# Patient Record
Sex: MALE | Race: OTHER | HISPANIC OR LATINO
[De-identification: names, ages, dates, MRNs, and addresses within clinical notes are randomized per-mention and may not be internally consistent; named-entity substitution may affect disease eponyms.]

---

## 2018-02-07 PROBLEM — Z00.00 ENCOUNTER FOR PREVENTIVE HEALTH EXAMINATION: Status: ACTIVE | Noted: 2018-02-07

## 2018-03-09 ENCOUNTER — APPOINTMENT (OUTPATIENT)
Dept: PULMONOLOGY | Facility: CLINIC | Age: 28
End: 2018-03-09
Payer: COMMERCIAL

## 2018-03-09 VITALS
BODY MASS INDEX: 23.03 KG/M2 | DIASTOLIC BLOOD PRESSURE: 60 MMHG | SYSTOLIC BLOOD PRESSURE: 120 MMHG | OXYGEN SATURATION: 97 % | HEIGHT: 72 IN | HEART RATE: 77 BPM | WEIGHT: 170 LBS

## 2018-03-09 DIAGNOSIS — Z82.0 FAMILY HISTORY OF EPILEPSY AND OTHER DISEASES OF THE NERVOUS SYSTEM: ICD-10-CM

## 2018-03-09 DIAGNOSIS — Z78.9 OTHER SPECIFIED HEALTH STATUS: ICD-10-CM

## 2018-03-09 DIAGNOSIS — Z87.09 PERSONAL HISTORY OF OTHER DISEASES OF THE RESPIRATORY SYSTEM: ICD-10-CM

## 2018-03-09 DIAGNOSIS — Z82.61 FAMILY HISTORY OF ARTHRITIS: ICD-10-CM

## 2018-03-09 PROCEDURE — 94010 BREATHING CAPACITY TEST: CPT

## 2018-03-09 PROCEDURE — 99204 OFFICE O/P NEW MOD 45 MIN: CPT | Mod: 25

## 2018-03-09 PROCEDURE — 71046 X-RAY EXAM CHEST 2 VIEWS: CPT

## 2018-03-12 ENCOUNTER — INPATIENT (INPATIENT)
Facility: HOSPITAL | Age: 28
LOS: 7 days | Discharge: HOME | End: 2018-03-20
Attending: PSYCHIATRY & NEUROLOGY | Admitting: PSYCHIATRY & NEUROLOGY
Payer: COMMERCIAL

## 2018-03-12 VITALS
RESPIRATION RATE: 16 BRPM | HEART RATE: 74 BPM | SYSTOLIC BLOOD PRESSURE: 118 MMHG | TEMPERATURE: 99 F | OXYGEN SATURATION: 100 % | DIASTOLIC BLOOD PRESSURE: 78 MMHG

## 2018-03-12 LAB
ALBUMIN SERPL ELPH-MCNC: 5 G/DL — SIGNIFICANT CHANGE UP (ref 3.3–5)
ALP SERPL-CCNC: 62 U/L — SIGNIFICANT CHANGE UP (ref 40–120)
ALT FLD-CCNC: 16 U/L — SIGNIFICANT CHANGE UP (ref 4–41)
AMPHET UR-MCNC: NEGATIVE — SIGNIFICANT CHANGE UP
ANISOCYTOSIS BLD QL: SLIGHT — SIGNIFICANT CHANGE UP
APAP SERPL-MCNC: < 15 UG/ML — LOW (ref 15–25)
APPEARANCE UR: CLEAR — SIGNIFICANT CHANGE UP
AST SERPL-CCNC: 19 U/L — SIGNIFICANT CHANGE UP (ref 4–40)
BARBITURATES MEASUREMENT: NEGATIVE — SIGNIFICANT CHANGE UP
BARBITURATES UR SCN-MCNC: NEGATIVE — SIGNIFICANT CHANGE UP
BASOPHILS NFR SPEC: 0.9 % — SIGNIFICANT CHANGE UP (ref 0–2)
BENZODIAZ SERPL-MCNC: NEGATIVE — SIGNIFICANT CHANGE UP
BENZODIAZ UR-MCNC: NEGATIVE — SIGNIFICANT CHANGE UP
BILIRUB SERPL-MCNC: 0.5 MG/DL — SIGNIFICANT CHANGE UP (ref 0.2–1.2)
BILIRUB UR-MCNC: NEGATIVE — SIGNIFICANT CHANGE UP
BLOOD UR QL VISUAL: NEGATIVE — SIGNIFICANT CHANGE UP
BUN SERPL-MCNC: 9 MG/DL — SIGNIFICANT CHANGE UP (ref 7–23)
CALCIUM SERPL-MCNC: 9.5 MG/DL — SIGNIFICANT CHANGE UP (ref 8.4–10.5)
CANNABINOIDS UR-MCNC: NEGATIVE — SIGNIFICANT CHANGE UP
CHLORIDE SERPL-SCNC: 100 MMOL/L — SIGNIFICANT CHANGE UP (ref 98–107)
CO2 SERPL-SCNC: 32 MMOL/L — HIGH (ref 22–31)
COCAINE METAB.OTHER UR-MCNC: NEGATIVE — SIGNIFICANT CHANGE UP
COLOR SPEC: SIGNIFICANT CHANGE UP
CREAT SERPL-MCNC: 1.06 MG/DL — SIGNIFICANT CHANGE UP (ref 0.5–1.3)
EOSINOPHIL NFR FLD: 0.9 % — SIGNIFICANT CHANGE UP (ref 0–6)
ETHANOL BLD-MCNC: < 10 MG/DL — SIGNIFICANT CHANGE UP
GIANT PLATELETS BLD QL SMEAR: PRESENT — SIGNIFICANT CHANGE UP
GLUCOSE SERPL-MCNC: 89 MG/DL — SIGNIFICANT CHANGE UP (ref 70–99)
GLUCOSE UR-MCNC: NEGATIVE — SIGNIFICANT CHANGE UP
HCT VFR BLD CALC: 51.9 % — HIGH (ref 39–50)
HGB BLD-MCNC: 17.8 G/DL — HIGH (ref 13–17)
KETONES UR-MCNC: NEGATIVE — SIGNIFICANT CHANGE UP
LEUKOCYTE ESTERASE UR-ACNC: NEGATIVE — SIGNIFICANT CHANGE UP
LYMPHOCYTES NFR SPEC AUTO: 43.5 % — SIGNIFICANT CHANGE UP (ref 13–44)
MCHC RBC-ENTMCNC: 30.8 PG — SIGNIFICANT CHANGE UP (ref 27–34)
MCHC RBC-ENTMCNC: 34.3 % — SIGNIFICANT CHANGE UP (ref 32–36)
MCV RBC AUTO: 89.9 FL — SIGNIFICANT CHANGE UP (ref 80–100)
METHADONE UR-MCNC: NEGATIVE — SIGNIFICANT CHANGE UP
MONOCYTES NFR BLD: 5.2 % — SIGNIFICANT CHANGE UP (ref 2–9)
MUCOUS THREADS # UR AUTO: SIGNIFICANT CHANGE UP
NEUTROPHIL AB SER-ACNC: 44.3 % — SIGNIFICANT CHANGE UP (ref 43–77)
NITRITE UR-MCNC: NEGATIVE — SIGNIFICANT CHANGE UP
OPIATES UR-MCNC: NEGATIVE — SIGNIFICANT CHANGE UP
OXYCODONE UR-MCNC: NEGATIVE — SIGNIFICANT CHANGE UP
PCP UR-MCNC: NEGATIVE — SIGNIFICANT CHANGE UP
PH UR: 6.5 — SIGNIFICANT CHANGE UP (ref 4.6–8)
PLATELET # BLD AUTO: 114 K/UL — LOW (ref 150–400)
PLATELET COUNT - ESTIMATE: SIGNIFICANT CHANGE UP
PMV BLD: 13.6 FL — HIGH (ref 7–13)
POLYCHROMASIA BLD QL SMEAR: SLIGHT — SIGNIFICANT CHANGE UP
POTASSIUM SERPL-MCNC: 4.3 MMOL/L — SIGNIFICANT CHANGE UP (ref 3.5–5.3)
POTASSIUM SERPL-SCNC: 4.3 MMOL/L — SIGNIFICANT CHANGE UP (ref 3.5–5.3)
PROT SERPL-MCNC: 7.7 G/DL — SIGNIFICANT CHANGE UP (ref 6–8.3)
PROT UR-MCNC: NEGATIVE MG/DL — SIGNIFICANT CHANGE UP
RBC # BLD: 5.77 M/UL — SIGNIFICANT CHANGE UP (ref 4.2–5.8)
RBC # FLD: 12.3 % — SIGNIFICANT CHANGE UP (ref 10.3–14.5)
RBC CASTS # UR COMP ASSIST: SIGNIFICANT CHANGE UP (ref 0–?)
SALICYLATES SERPL-MCNC: < 5 MG/DL — LOW (ref 15–30)
SODIUM SERPL-SCNC: 141 MMOL/L — SIGNIFICANT CHANGE UP (ref 135–145)
SP GR SPEC: 1.01 — SIGNIFICANT CHANGE UP (ref 1–1.04)
SQUAMOUS # UR AUTO: SIGNIFICANT CHANGE UP
TSH SERPL-MCNC: 1.29 UIU/ML — SIGNIFICANT CHANGE UP (ref 0.27–4.2)
UROBILINOGEN FLD QL: NORMAL MG/DL — SIGNIFICANT CHANGE UP
VARIANT LYMPHS # BLD: 5.2 % — SIGNIFICANT CHANGE UP
WBC # BLD: 5.13 K/UL — SIGNIFICANT CHANGE UP (ref 3.8–10.5)
WBC # FLD AUTO: 5.13 K/UL — SIGNIFICANT CHANGE UP (ref 3.8–10.5)
WBC UR QL: SIGNIFICANT CHANGE UP (ref 0–?)

## 2018-03-12 PROCEDURE — 99285 EMERGENCY DEPT VISIT HI MDM: CPT

## 2018-03-12 NOTE — ED BEHAVIORAL HEALTH ASSESSMENT NOTE - SUBSTANCE ISSUES AND PLAN (INCLUDE STANDING AND PRN MEDICATION)
patient does not report any recent consistent alcohol use (last drink Saturday, previously not in weeks), no hx of DTs, no indication for ciwa

## 2018-03-12 NOTE — ED BEHAVIORAL HEALTH ASSESSMENT NOTE - SUMMARY
28 y/o single male, domiciled alone in an apartment in [place],  studying data analytics, history of depressive symptoms x 7 years, history of therapy, no prior psychiatric hospitalizations, describes 2 suicide attempts in past, no violence hx, no legal problems, history of binge drinking ETOH, BIB EMS from [facility] after patient told the providers there that he was having suicidal ideation with plan to jump from his 10th story apartment window.  In ED, patient is depressed, reporting suicidal thoughts with plan to jump from 10th story apartment building. He states he is feeling hopeless, overwhelmed & is seeking help. He would benefit greatly from an inpatient psychiatric hospitalization for safety and stabilization.

## 2018-03-12 NOTE — ED ADULT NURSE REASSESSMENT NOTE - ANCILLARY STATUS
----- Message from Angela Wood sent at 9/6/2017  1:03 PM CDT -----  Contact: Fabian Velasco (Spouse)  _ x 1st Request  _  2nd Request  _  3rd Request        Who: Fabian Velasco (Spouse)    Why: Patient would like a referral to see a doctor for his shoulder. Please call    What Number to Call Back: [phone]    When to Expect a call back: (With in 24 hours)                     ekg done/lab results pending

## 2018-03-12 NOTE — ED PROVIDER NOTE - MEDICAL DECISION MAKING DETAILS
This is a 27 year old male PMHX GEMA from [facility] for psych eval r/t suicidal ideations.  Patient reports increased depression and increased anxiety has given him thoughts to jump out of his doom window. Reports he went to the counseling center for help and they called EMS Medical evaluation performed. There is no clinical evidence of intoxication or any acute medical problem requiring immediate intervention. Final disposition will be determined by psychiatrist.

## 2018-03-12 NOTE — ED BEHAVIORAL HEALTH ASSESSMENT NOTE - HPI (INCLUDE ILLNESS QUALITY, SEVERITY, DURATION, TIMING, CONTEXT, MODIFYING FACTORS, ASSOCIATED SIGNS AND SYMPTOMS)
26 y/o single male, domiciled alone in an apartment in [place],  studying data analytics, no dependents, history of depressive symptoms x 7 years, history of therapy, no hx of medication trials, no prior psychiatric hospitalizations, describes 2 suicide attempts in past, no violence hx, no legal problems, history of binge drinking ETOH, no hx of Dts/complicated withdrawals, no drug use, PMH of scoliosis, BIB EMS from [facility] after patient told the providers there that he was having suicidal ideation with plan to jump from his 10th story apartment window.    Patient reports that he has a 7 year history of depressive symptoms, which primarily consist of sad mood, low self-worth, anhedonia, preoccupation with death & significant memory/concentration issues. He states that the symptoms began in high school after he sustained a collapsed lung and was away from school for 1 month. He states since that time, he has noticed that his symptoms have become more intense and suicide has become a more serious consideration for him. He states that in December he bought a bottle of wine & consumed the bottle in 2 hours with a disregard for his health & safety. He states that he considers this a passive suicide attempt. Patient reports that recently, he has been looking at the window in his apartment and thinking about jumping. He states that he was seriously considering this over the weekend but reached out to friends for help. He states that today, the suicidal impulses began so strong that he went to the counseling center for help. He states he is currently very depressed, anxious & overwhelmed.   He reports having periods of time where his mood is elevated, he has more energy & has a decreased need for sleep, but denies any hx of dangerous/risk taking behaviors during these periods. He reports that he often feels a compulsion to engage in constant activity, which is a consistent trait rather than episodic. He states that he is obsessive about various things at different times; states currently he is focused on grades (the suicide attempt in December was in response to an A-) but in the past he has had other objects of focus such as working out excessively or participating in sports "from morning until night".     Patient does not want parents involved in his care as he feels they will not be supportive if he chooses to take medication.  Writer called Dr. Hernández but she did not return voicemail left. Secure email sent.

## 2018-03-12 NOTE — ED ADULT NURSE NOTE - CHIEF COMPLAINT QUOTE
pt brought in by ems for increased stress for the past few weeks. pt from [facility] and reached out to behavioral health services for SI today with plan to jump out of 10th story window. pt states he feels better after talking to psychiatrist today, denies SI at this time. denies drug/alcohol use. denies HI. denies pmh

## 2018-03-12 NOTE — ED PROVIDER NOTE - OBJECTIVE STATEMENT
This is a 27 year old male PMhx non GEMA from [facility]  no past atempt no hospittal no meds no theapis + SI thoughts This is a 27 year old male PMHX BIBIA from [facility] for psych eval r/t suicidal ideations.  Patient reports increased depression and increased anxiety has given him thoughts to jump out of his doom window. Reports he went to the counseling center for help and they called EMS. Denies pasty SI attempts Denies any self harm behaviors. Denies past psych admissions Denies medication  Denies HI Denies AH/VH Denies ETOH/Illicit drugs

## 2018-03-12 NOTE — ED BEHAVIORAL HEALTH ASSESSMENT NOTE - DETAILS
reports hx of holding a knife with plan to stab self but being interrupted by mother years ago; in dec 2017 drank bottle of wine in 2 hours and identifies this as a suicide attempt birthmark on face handoff to Dr. Obregon Dr. Hernández did not respond

## 2018-03-12 NOTE — ED BEHAVIORAL HEALTH ASSESSMENT NOTE - DESCRIPTION
anxious cooperative  ICU Vital Signs Last 24 Hrs  T(C): 37 (12 Mar 2018 21:01), Max: 37 (12 Mar 2018 21:01)  T(F): 98.6 (12 Mar 2018 21:01), Max: 98.6 (12 Mar 2018 21:01)  HR: 74 (12 Mar 2018 21:01) (74 - 74)  BP: 118/78 (12 Mar 2018 21:01) (118/78 - 118/78)  BP(mean): --  ABP: --  ABP(mean): --  RR: 16 (12 Mar 2018 21:01) (16 - 16)  SpO2: 100% (12 Mar 2018 21:01) (100% - 100%) scoliosis see hpi

## 2018-03-12 NOTE — ED BEHAVIORAL HEALTH ASSESSMENT NOTE - SUICIDE RISK FACTORS
Anhedonia/Hopelessness/Mood episode/Highly impulsive behavior/Substance abuse/dependence/Agitation/severe anxiety/Access to means (pills, firearms, etc.)

## 2018-03-12 NOTE — ED ADULT NURSE REASSESSMENT NOTE - GENERAL PATIENT STATE
reports ongoing suicidal thoughts to jump off of a roof, no intent to harm self at this time/comfortable appearance

## 2018-03-12 NOTE — ED BEHAVIORAL HEALTH ASSESSMENT NOTE - RISK ASSESSMENT
Risk factors include history of suicide attempts, substance abuse, living alone, no dependents, impulsivity, impaired insight & judgement, current suicidal ideation with plan and intent. This patient is at high risk for harm and requires inpatient level of care for safety and stabilization.

## 2018-03-13 DIAGNOSIS — F33.2 MAJOR DEPRESSIVE DISORDER, RECURRENT SEVERE WITHOUT PSYCHOTIC FEATURES: ICD-10-CM

## 2018-03-13 DIAGNOSIS — R69 ILLNESS, UNSPECIFIED: ICD-10-CM

## 2018-03-13 DIAGNOSIS — F33.9 MAJOR DEPRESSIVE DISORDER, RECURRENT, UNSPECIFIED: ICD-10-CM

## 2018-03-13 PROCEDURE — 99223 1ST HOSP IP/OBS HIGH 75: CPT | Mod: 25

## 2018-03-13 PROCEDURE — 90853 GROUP PSYCHOTHERAPY: CPT

## 2018-03-13 RX ORDER — IBUPROFEN 200 MG
600 TABLET ORAL EVERY 6 HOURS
Qty: 0 | Refills: 0 | Status: DISCONTINUED | OUTPATIENT
Start: 2018-03-13 | End: 2018-03-20

## 2018-03-13 RX ORDER — ACETAMINOPHEN 500 MG
650 TABLET ORAL EVERY 6 HOURS
Qty: 0 | Refills: 0 | Status: DISCONTINUED | OUTPATIENT
Start: 2018-03-13 | End: 2018-03-20

## 2018-03-13 RX ORDER — TRAZODONE HCL 50 MG
50 TABLET ORAL AT BEDTIME
Qty: 0 | Refills: 0 | Status: DISCONTINUED | OUTPATIENT
Start: 2018-03-13 | End: 2018-03-20

## 2018-03-13 RX ORDER — HYDROXYZINE HCL 10 MG
50 TABLET ORAL
Qty: 0 | Refills: 0 | Status: DISCONTINUED | OUTPATIENT
Start: 2018-03-14 | End: 2018-03-14

## 2018-03-13 RX ORDER — HYDROXYZINE HCL 10 MG
50 TABLET ORAL ONCE
Qty: 0 | Refills: 0 | Status: COMPLETED | OUTPATIENT
Start: 2018-03-13 | End: 2018-03-13

## 2018-03-13 RX ADMIN — Medication 50 MILLIGRAM(S): at 16:56

## 2018-03-13 NOTE — ED ADULT NURSE REASSESSMENT NOTE - NS ED NURSE REASSESS COMMENT FT1
Medically cleared by Erin SMITH for [facility] admission to [facility]. Pt remains awake, a&ox3, calm. Denies intent to self harm while in hospital, but has depressed affect. Report given to Genaro GODWIN. Pt ordered to transfer with security by Lulú SMITH.

## 2018-03-14 PROCEDURE — 99232 SBSQ HOSP IP/OBS MODERATE 35: CPT

## 2018-03-14 RX ORDER — CLONAZEPAM 1 MG
0.5 TABLET ORAL
Qty: 0 | Refills: 0 | Status: DISCONTINUED | OUTPATIENT
Start: 2018-03-14 | End: 2018-03-15

## 2018-03-14 RX ADMIN — Medication 50 MILLIGRAM(S): at 08:43

## 2018-03-14 RX ADMIN — Medication 0.5 MILLIGRAM(S): at 17:58

## 2018-03-15 PROCEDURE — 90853 GROUP PSYCHOTHERAPY: CPT

## 2018-03-15 RX ORDER — CLONAZEPAM 1 MG
0.5 TABLET ORAL
Qty: 0 | Refills: 0 | Status: DISCONTINUED | OUTPATIENT
Start: 2018-03-15 | End: 2018-03-20

## 2018-03-15 RX ORDER — FLUOXETINE HCL 10 MG
10 CAPSULE ORAL DAILY
Qty: 0 | Refills: 0 | Status: DISCONTINUED | OUTPATIENT
Start: 2018-03-16 | End: 2018-03-16

## 2018-03-15 RX ORDER — FLUOXETINE HCL 10 MG
10 CAPSULE ORAL ONCE
Qty: 0 | Refills: 0 | Status: COMPLETED | OUTPATIENT
Start: 2018-03-15 | End: 2018-03-15

## 2018-03-15 RX ADMIN — Medication 10 MILLIGRAM(S): at 18:19

## 2018-03-15 RX ADMIN — Medication 0.5 MILLIGRAM(S): at 21:10

## 2018-03-15 RX ADMIN — Medication 0.5 MILLIGRAM(S): at 09:21

## 2018-03-16 PROCEDURE — 99232 SBSQ HOSP IP/OBS MODERATE 35: CPT

## 2018-03-16 RX ORDER — FLUOXETINE HCL 10 MG
20 CAPSULE ORAL DAILY
Qty: 0 | Refills: 0 | Status: DISCONTINUED | OUTPATIENT
Start: 2018-03-16 | End: 2018-03-19

## 2018-03-16 RX ADMIN — Medication 0.5 MILLIGRAM(S): at 21:07

## 2018-03-16 RX ADMIN — Medication 0.5 MILLIGRAM(S): at 08:43

## 2018-03-16 RX ADMIN — Medication 10 MILLIGRAM(S): at 08:43

## 2018-03-17 PROCEDURE — 99232 SBSQ HOSP IP/OBS MODERATE 35: CPT

## 2018-03-17 RX ADMIN — Medication 0.5 MILLIGRAM(S): at 21:11

## 2018-03-17 RX ADMIN — Medication 20 MILLIGRAM(S): at 08:57

## 2018-03-17 RX ADMIN — Medication 0.5 MILLIGRAM(S): at 08:57

## 2018-03-18 RX ADMIN — Medication 20 MILLIGRAM(S): at 08:54

## 2018-03-18 RX ADMIN — Medication 50 MILLIGRAM(S): at 22:45

## 2018-03-18 RX ADMIN — Medication 0.5 MILLIGRAM(S): at 21:14

## 2018-03-18 RX ADMIN — Medication 0.5 MILLIGRAM(S): at 08:54

## 2018-03-19 PROCEDURE — 99232 SBSQ HOSP IP/OBS MODERATE 35: CPT

## 2018-03-19 RX ORDER — FLUOXETINE HCL 10 MG
30 CAPSULE ORAL DAILY
Qty: 0 | Refills: 0 | Status: DISCONTINUED | OUTPATIENT
Start: 2018-03-19 | End: 2018-03-20

## 2018-03-19 RX ORDER — FLUOXETINE HCL 10 MG
1 CAPSULE ORAL
Qty: 30 | Refills: 0 | OUTPATIENT
Start: 2018-03-19 | End: 2018-04-17

## 2018-03-19 RX ORDER — CLONAZEPAM 1 MG
1 TABLET ORAL
Qty: 60 | Refills: 0 | OUTPATIENT
Start: 2018-03-19 | End: 2018-04-17

## 2018-03-19 RX ORDER — FLUOXETINE HCL 10 MG
3 CAPSULE ORAL
Qty: 90 | Refills: 0 | OUTPATIENT
Start: 2018-03-19 | End: 2018-04-17

## 2018-03-19 RX ADMIN — Medication 0.5 MILLIGRAM(S): at 22:16

## 2018-03-19 RX ADMIN — Medication 50 MILLIGRAM(S): at 23:30

## 2018-03-19 RX ADMIN — Medication 0.5 MILLIGRAM(S): at 08:53

## 2018-03-19 RX ADMIN — Medication 20 MILLIGRAM(S): at 08:53

## 2018-03-20 VITALS — HEART RATE: 82 BPM | SYSTOLIC BLOOD PRESSURE: 125 MMHG | DIASTOLIC BLOOD PRESSURE: 77 MMHG | TEMPERATURE: 98 F

## 2018-03-20 PROCEDURE — 90853 GROUP PSYCHOTHERAPY: CPT

## 2018-03-20 PROCEDURE — 90832 PSYTX W PT 30 MINUTES: CPT | Mod: 59

## 2018-03-20 PROCEDURE — 99238 HOSP IP/OBS DSCHRG MGMT 30/<: CPT | Mod: 25

## 2018-03-20 RX ADMIN — Medication 0.5 MILLIGRAM(S): at 08:24

## 2018-03-20 RX ADMIN — Medication 30 MILLIGRAM(S): at 08:24

## 2018-03-26 ENCOUNTER — OUTPATIENT (OUTPATIENT)
Dept: OUTPATIENT SERVICES | Facility: HOSPITAL | Age: 28
LOS: 1 days | Discharge: HOME | End: 2018-03-26
Payer: COMMERCIAL

## 2018-03-27 DIAGNOSIS — F33.2 MAJOR DEPRESSIVE DISORDER, RECURRENT SEVERE WITHOUT PSYCHOTIC FEATURES: ICD-10-CM

## 2018-03-27 LAB
A1AT SERPL-MCNC: 116 MG/DL
ERYTHROCYTE [SEDIMENTATION RATE] IN BLOOD BY WESTERGREN METHOD: 2 MM/HR

## 2018-03-29 LAB
A1AT PHENOTYP SERPL-IMP: NORMAL BANDS
A1AT SERPL-MCNC: 108 MG/DL

## 2018-04-04 LAB — SERPINA1 GENE MUT TESTED BLD/T: NORMAL

## 2018-04-13 ENCOUNTER — APPOINTMENT (OUTPATIENT)
Dept: PULMONOLOGY | Facility: CLINIC | Age: 28
End: 2018-04-13
Payer: COMMERCIAL

## 2018-04-13 VITALS
SYSTOLIC BLOOD PRESSURE: 110 MMHG | DIASTOLIC BLOOD PRESSURE: 70 MMHG | HEIGHT: 72 IN | HEART RATE: 85 BPM | BODY MASS INDEX: 23.03 KG/M2 | RESPIRATION RATE: 15 BRPM | OXYGEN SATURATION: 95 % | WEIGHT: 170 LBS

## 2018-04-13 DIAGNOSIS — R09.1 PLEURISY: ICD-10-CM

## 2018-04-13 DIAGNOSIS — R07.9 CHEST PAIN, UNSPECIFIED: ICD-10-CM

## 2018-04-13 DIAGNOSIS — R93.8 ABNORMAL FINDINGS ON DIAGNOSTIC IMAGING OF OTHER SPECIFIED BODY STRUCTURES: ICD-10-CM

## 2018-04-13 DIAGNOSIS — Z87.09 PERSONAL HISTORY OF OTHER DISEASES OF THE RESPIRATORY SYSTEM: ICD-10-CM

## 2018-04-13 DIAGNOSIS — R06.02 SHORTNESS OF BREATH: ICD-10-CM

## 2018-04-13 DIAGNOSIS — R06.83 SNORING: ICD-10-CM

## 2018-04-13 DIAGNOSIS — R91.1 SOLITARY PULMONARY NODULE: ICD-10-CM

## 2018-04-13 PROCEDURE — 94729 DIFFUSING CAPACITY: CPT

## 2018-04-13 PROCEDURE — 94010 BREATHING CAPACITY TEST: CPT

## 2018-04-13 PROCEDURE — 99214 OFFICE O/P EST MOD 30 MIN: CPT | Mod: 25

## 2018-04-13 PROCEDURE — 94727 GAS DIL/WSHOT DETER LNG VOL: CPT

## 2018-10-15 ENCOUNTER — APPOINTMENT (OUTPATIENT)
Dept: PULMONOLOGY | Facility: CLINIC | Age: 28
End: 2018-10-15

## 2018-12-03 ENCOUNTER — APPOINTMENT (OUTPATIENT)
Dept: INTERNAL MEDICINE | Facility: CLINIC | Age: 28
End: 2018-12-03

## 2019-07-26 LAB
ALBUMIN SERPL ELPH-MCNC: 4.7 G/DL — SIGNIFICANT CHANGE UP (ref 3.3–5)
ALP SERPL-CCNC: 55 U/L — SIGNIFICANT CHANGE UP (ref 40–120)
ALT FLD-CCNC: 12 U/L — SIGNIFICANT CHANGE UP (ref 4–41)
ANION GAP SERPL CALC-SCNC: 10 MMO/L — SIGNIFICANT CHANGE UP (ref 7–14)
APPEARANCE UR: CLEAR — SIGNIFICANT CHANGE UP
AST SERPL-CCNC: 17 U/L — SIGNIFICANT CHANGE UP (ref 4–40)
BILIRUB SERPL-MCNC: 0.4 MG/DL — SIGNIFICANT CHANGE UP (ref 0.2–1.2)
BILIRUB UR-MCNC: NEGATIVE — SIGNIFICANT CHANGE UP
BLOOD UR QL VISUAL: NEGATIVE — SIGNIFICANT CHANGE UP
BUN SERPL-MCNC: 14 MG/DL — SIGNIFICANT CHANGE UP (ref 7–23)
CALCIUM SERPL-MCNC: 9.9 MG/DL — SIGNIFICANT CHANGE UP (ref 8.4–10.5)
CHLORIDE SERPL-SCNC: 104 MMOL/L — SIGNIFICANT CHANGE UP (ref 98–107)
CHLORIDE UR-SCNC: 167 MMOL/L — SIGNIFICANT CHANGE UP
CO2 SERPL-SCNC: 28 MMOL/L — SIGNIFICANT CHANGE UP (ref 22–31)
COLOR SPEC: YELLOW — SIGNIFICANT CHANGE UP
CREAT SERPL-MCNC: 1.01 MG/DL — SIGNIFICANT CHANGE UP (ref 0.5–1.3)
GLUCOSE SERPL-MCNC: 80 MG/DL — SIGNIFICANT CHANGE UP (ref 70–99)
GLUCOSE UR-MCNC: NEGATIVE — SIGNIFICANT CHANGE UP
KETONES UR-MCNC: NEGATIVE — SIGNIFICANT CHANGE UP
LEUKOCYTE ESTERASE UR-ACNC: NEGATIVE — SIGNIFICANT CHANGE UP
NITRITE UR-MCNC: NEGATIVE — SIGNIFICANT CHANGE UP
PH UR: 7 — SIGNIFICANT CHANGE UP (ref 5–8)
POTASSIUM SERPL-MCNC: 4.2 MMOL/L — SIGNIFICANT CHANGE UP (ref 3.5–5.3)
POTASSIUM SERPL-SCNC: 4.2 MMOL/L — SIGNIFICANT CHANGE UP (ref 3.5–5.3)
POTASSIUM UR-SCNC: 50.7 MMOL/L — SIGNIFICANT CHANGE UP
PROT SERPL-MCNC: 7 G/DL — SIGNIFICANT CHANGE UP (ref 6–8.3)
PROT UR-MCNC: NEGATIVE — SIGNIFICANT CHANGE UP
SODIUM SERPL-SCNC: 142 MMOL/L — SIGNIFICANT CHANGE UP (ref 135–145)
SODIUM UR-SCNC: 219 MMOL/L — SIGNIFICANT CHANGE UP
SP GR SPEC: 1.02 — SIGNIFICANT CHANGE UP (ref 1–1.04)
UROBILINOGEN FLD QL: NORMAL — SIGNIFICANT CHANGE UP

## 2021-11-08 PROCEDURE — 99214 OFFICE O/P EST MOD 30 MIN: CPT

## 2021-11-08 NOTE — ED ADULT NURSE NOTE - OBJECTIVE STATEMENT
Irish Panchal is here today for Blood Pressure (bp check)    Denies Latex allergy or sensitivity  Tobacco history: verified  Medications: medications verified and updated  Refills needed today?  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 Pt reports that he has had SI thoughts in the past, one other time attempted to take own life by wanting and having knife to cut throat. Pt states he heard his mother call his name and thought she knew what he was doing, which made him abort the attempt, pt denies any pain or discomfort.

## 2021-11-11 PROCEDURE — 99214 OFFICE O/P EST MOD 30 MIN: CPT | Mod: 95

## 2022-01-13 PROCEDURE — 99214 OFFICE O/P EST MOD 30 MIN: CPT | Mod: 95

## 2022-04-28 PROCEDURE — 99214 OFFICE O/P EST MOD 30 MIN: CPT | Mod: 95

## 2022-05-19 PROCEDURE — 99214 OFFICE O/P EST MOD 30 MIN: CPT | Mod: 95

## 2022-09-01 PROCEDURE — 99214 OFFICE O/P EST MOD 30 MIN: CPT | Mod: 95

## 2022-12-01 ENCOUNTER — TRANSCRIPTION ENCOUNTER (OUTPATIENT)
Age: 32
End: 2022-12-01

## 2023-08-19 NOTE — ED ADULT TRIAGE NOTE - CHIEF COMPLAINT QUOTE
pt brought in by ems for increased stress for the past few weeks. pt from [facility] and reached out to behavioral health services for SI today with plan to jump out of 10th story window. pt states he feels better after talking to psychiatrist today, denies SI at this time. denies drug/alcohol use. denies HI. denies pmh [id number]

## 2024-04-19 NOTE — ED PROVIDER NOTE - RATE
Spoke with the patient and updated him that after discussion with Dr. Murphy he cannot accommodate the logistics of blood transfusions/platelets at this office. Pt verbalized understanding and had no further questions or concerns.   63